# Patient Record
Sex: MALE | Race: WHITE | NOT HISPANIC OR LATINO | URBAN - METROPOLITAN AREA
[De-identification: names, ages, dates, MRNs, and addresses within clinical notes are randomized per-mention and may not be internally consistent; named-entity substitution may affect disease eponyms.]

---

## 2023-05-14 ENCOUNTER — OFFICE VISIT (OUTPATIENT)
Dept: URGENT CARE | Facility: PHYSICIAN GROUP | Age: 53
End: 2023-05-14
Payer: COMMERCIAL

## 2023-05-14 ENCOUNTER — APPOINTMENT (OUTPATIENT)
Dept: RADIOLOGY | Facility: IMAGING CENTER | Age: 53
End: 2023-05-14
Attending: STUDENT IN AN ORGANIZED HEALTH CARE EDUCATION/TRAINING PROGRAM
Payer: COMMERCIAL

## 2023-05-14 VITALS
TEMPERATURE: 97.2 F | WEIGHT: 218.6 LBS | SYSTOLIC BLOOD PRESSURE: 132 MMHG | RESPIRATION RATE: 18 BRPM | HEART RATE: 89 BPM | HEIGHT: 73 IN | DIASTOLIC BLOOD PRESSURE: 78 MMHG | OXYGEN SATURATION: 97 % | BODY MASS INDEX: 28.97 KG/M2

## 2023-05-14 DIAGNOSIS — S92.512A CLOSED DISPLACED FRACTURE OF PROXIMAL PHALANX OF LESSER TOE OF LEFT FOOT, INITIAL ENCOUNTER: ICD-10-CM

## 2023-05-14 DIAGNOSIS — S99.922A INJURY OF TOE ON LEFT FOOT, INITIAL ENCOUNTER: ICD-10-CM

## 2023-05-14 PROCEDURE — 3078F DIAST BP <80 MM HG: CPT | Performed by: STUDENT IN AN ORGANIZED HEALTH CARE EDUCATION/TRAINING PROGRAM

## 2023-05-14 PROCEDURE — 73660 X-RAY EXAM OF TOE(S): CPT | Mod: TC,LT | Performed by: NURSE PRACTITIONER

## 2023-05-14 PROCEDURE — 3075F SYST BP GE 130 - 139MM HG: CPT | Performed by: STUDENT IN AN ORGANIZED HEALTH CARE EDUCATION/TRAINING PROGRAM

## 2023-05-14 PROCEDURE — 99204 OFFICE O/P NEW MOD 45 MIN: CPT | Performed by: STUDENT IN AN ORGANIZED HEALTH CARE EDUCATION/TRAINING PROGRAM

## 2023-05-14 NOTE — PROGRESS NOTES
"Subjective:   Wilber Montero is a 52 y.o. male who presents for Dislocation (Left baby toe kick table leg yesterday  now is crooked)      HPI:  Pleasant 52-year-old male presents to the urgent care after hitting his left fifth toe on a table leg.  He reports that her toe is bent following this.  He states that he can walk on it but it is uncomfortable.  He has not been able to put on shoes due to the angulation of the toe.  He denies any numbness, tingling, burning, radiation of pain into the foot, damage to the nail, or discoloration.      Medications:    This patient does not have an active medication from one of the medication groupers.    Allergies: Patient has no known allergies.    Problem List: Wilber Montero does not have a problem list on file.    Surgical History:  No past surgical history on file.    Past Social Hx: Wilber Montero  reports that he has been smoking cigarettes. He has never used smokeless tobacco. He reports current alcohol use.     Past Family Hx:  Wilber Montero family history is not on file.     Problem list, medications, and allergies reviewed by myself today in Epic.     Objective:     /78 (BP Location: Left arm, Patient Position: Sitting, BP Cuff Size: Adult)   Pulse 89   Temp 36.2 °C (97.2 °F) (Temporal)   Resp 18   Ht 1.854 m (6' 1\")   Wt 99.2 kg (218 lb 9.6 oz)   SpO2 97%   BMI 28.84 kg/m²     Physical Exam  Vitals reviewed.   Constitutional:       General: He is not in acute distress.     Appearance: Normal appearance.   Eyes:      Conjunctiva/sclera: Conjunctivae normal.   Cardiovascular:      Rate and Rhythm: Normal rate.      Pulses: Normal pulses.   Pulmonary:      Effort: Pulmonary effort is normal.   Feet:      Comments: Left fifth toe: Lateral deformity of the toe with moderate amount of swelling.  No damage to the nail.  No erythema or ecchymosis.  No TTP.  Sensation intact and cap refill less than 2 seconds.  2+ pedal pulse.  No break in " the skin.    Discussed reduction in clinic which patient was agreeable to.  Digital block of the fifth toe was performed with 2% lidocaine without epinephrine.  Manual reduction was attempted and audible pop was detected.  Several attempts to fully reduce the toe were made.  Small noticeable change on physical exam, however patient swelling is making it difficult to notice if there is for alignment at this time.  Skin:     General: Skin is dry.   Neurological:      General: No focal deficit present.      Mental Status: He is alert.         RADIOLOGY RESULTS   DX-TOE(S) 2+ LEFT    Result Date: 5/14/2023 5/14/2023 11:11 AM HISTORY/REASON FOR EXAM:  Pain/Deformity Following Trauma; Postreduction left fifth toe.. Postreduction TECHNIQUE/EXAM DESCRIPTION AND NUMBER OF VIEWS:  3 views of the LEFT toes. COMPARISON: Earlier in the day FINDINGS: Slightly improved alignment with persistent mild displacement of fifth proximal phalanx fracture and no dislocation.     Similar to slightly improved alignment of fifth proximal phalanx fracture    DX-TOE(S) 2+ LEFT    Result Date: 5/14/2023 5/14/2023 10:07 AM HISTORY/REASON FOR EXAM:  Pain/Deformity Following Trauma; Patient ran his foot into a table leg 1 day ago causing deformity to his left fifth toe.  Fifth toe is angulated laterally.  Neurovascular intact.. TECHNIQUE/EXAM DESCRIPTION AND NUMBER OF VIEWS:  3 views of the LEFT toes. COMPARISON: None FINDINGS: There is an acute mildly displaced and angulated fracture of the fifth proximal phalangeal diaphysis. No intra-articular extension. No dislocation.     Mildly displaced fifth proximal phalangeal shaft fracture.             Assessment/Plan:     Diagnosis and associated orders:     1. Closed displaced fracture of proximal phalanx of lesser toe of left foot, initial encounter  DX-TOE(S) 2+ LEFT    DX-TOE(S) 2+ LEFT    Referral to Orthopedics         Comments/MDM:     X-ray shows a mildly displaced fifth proximal phalangeal  shaft fracture.  Reduction was attempted in clinic.  Repeat x-ray shows similar or slightly improved alignment.  Multiple attempts were made to reduce the toe without complete success in regaining full alignment.  Toe was buddy taped and the patient was given referral to orthopedics for further evaluation and additional reduction.  Patient is neurovascularly intact.  No open fracture.  Advised patient to use ice 3-5 times per day for 20 minutes at a time.  Rigid walking boot was given.  Patient declined crutches.  Ibuprofen/Tylenol as needed for discomfort.  Elevate the foot as often as possible while at home.  Vitals all within normal limits.  Patient is agreeable to the plan.  ED/return precautions were given.         Differential diagnosis, natural history, supportive care, and indications for immediate follow-up discussed.    Advised the patient to follow-up with the primary care physician for recheck, reevaluation, and consideration of further management.    Please note that this dictation was created using voice recognition software. I have made a reasonable attempt to correct obvious errors, but I expect that there are errors of grammar and possibly content that I did not discover before finalizing the note.    Electronically signed by Frandy Villafuerte PA-C.